# Patient Record
Sex: FEMALE | Race: WHITE | Employment: OTHER | URBAN - METROPOLITAN AREA
[De-identification: names, ages, dates, MRNs, and addresses within clinical notes are randomized per-mention and may not be internally consistent; named-entity substitution may affect disease eponyms.]

---

## 2017-01-13 ENCOUNTER — PREPPED CHART (OUTPATIENT)
Dept: URBAN - METROPOLITAN AREA CLINIC 43 | Facility: CLINIC | Age: 57
End: 2017-01-13

## 2017-07-12 ASSESSMENT — VISUAL ACUITY
OD_SC: J1
OS_CC: J3
OD_CC: J2
OS_CC: 20/20
OS_SC: J1
OD_CC: 20/20

## 2017-07-12 ASSESSMENT — TONOMETRY
OS_IOP_MMHG: 18
OD_IOP_MMHG: 17

## 2018-10-26 ENCOUNTER — ESTABLISHED COMPREHENSIVE EXAM (OUTPATIENT)
Dept: URBAN - METROPOLITAN AREA CLINIC 43 | Facility: CLINIC | Age: 58
End: 2018-10-26

## 2018-10-26 DIAGNOSIS — H52.203: ICD-10-CM

## 2018-10-26 DIAGNOSIS — H52.4: ICD-10-CM

## 2018-10-26 PROCEDURE — 92014 COMPRE OPH EXAM EST PT 1/>: CPT

## 2018-10-26 PROCEDURE — 92015 DETERMINE REFRACTIVE STATE: CPT

## 2018-10-26 ASSESSMENT — VISUAL ACUITY
OD_CC: 20/20-1
OS_CC: 20/30+2
OS_SC: J1
OS_CC: J3
OD_SC: J1
OD_CC: J3

## 2018-10-26 ASSESSMENT — TONOMETRY
OS_IOP_MMHG: 16
OD_IOP_MMHG: 16

## 2020-01-20 ENCOUNTER — ESTABLISHED COMPREHENSIVE EXAM (OUTPATIENT)
Dept: URBAN - METROPOLITAN AREA CLINIC 39 | Facility: CLINIC | Age: 60
End: 2020-01-20

## 2020-01-20 DIAGNOSIS — H52.4: ICD-10-CM

## 2020-01-20 DIAGNOSIS — H52.13: ICD-10-CM

## 2020-01-20 DIAGNOSIS — Z46.0: ICD-10-CM

## 2020-01-20 DIAGNOSIS — H52.203: ICD-10-CM

## 2020-01-20 PROCEDURE — 92310-2 LEVEL 2 CONTACT LENS MANAGEMENT

## 2020-01-20 PROCEDURE — 92015 DETERMINE REFRACTIVE STATE: CPT

## 2020-01-20 PROCEDURE — 92014 COMPRE OPH EXAM EST PT 1/>: CPT

## 2020-01-20 ASSESSMENT — VISUAL ACUITY
OS_CC: 20/20-1
OD_CC: J1
OD_CC: 20/20
OS_SC: CF 5FT
OS_SC: J1
OD_SC: CF 5FT
OS_CC: J1
OD_SC: J1

## 2020-01-20 ASSESSMENT — TONOMETRY
OS_IOP_MMHG: 17
OD_IOP_MMHG: 16

## 2020-01-30 ENCOUNTER — CONTACT LENS FOLLOW UP (OUTPATIENT)
Dept: URBAN - METROPOLITAN AREA CLINIC 39 | Facility: CLINIC | Age: 60
End: 2020-01-30

## 2020-01-30 DIAGNOSIS — Z46.0: ICD-10-CM

## 2020-01-30 PROCEDURE — 92310F

## 2020-01-30 ASSESSMENT — VISUAL ACUITY
OD_CC: 20/80
OS_CC: 20/20-2
OS_CC: J12
OD_CC: J1

## 2022-05-26 ENCOUNTER — COMPREHENSIVE EXAM (OUTPATIENT)
Dept: URBAN - METROPOLITAN AREA CLINIC 35 | Facility: CLINIC | Age: 62
End: 2022-05-26

## 2022-05-26 DIAGNOSIS — H52.203: ICD-10-CM

## 2022-05-26 DIAGNOSIS — H52.13: ICD-10-CM

## 2022-05-26 DIAGNOSIS — H52.4: ICD-10-CM

## 2022-05-26 PROCEDURE — 92015 DETERMINE REFRACTIVE STATE: CPT

## 2022-05-26 PROCEDURE — 92014 COMPRE OPH EXAM EST PT 1/>: CPT

## 2022-05-26 ASSESSMENT — TONOMETRY
OD_IOP_MMHG: 15
OS_IOP_MMHG: 15

## 2022-05-26 ASSESSMENT — KERATOMETRY
OD_K1POWER_DIOPTERS: 46.25
OD_K2POWER_DIOPTERS: 46.75
OD_AXISANGLE2_DEGREES: 165
OS_K2POWER_DIOPTERS: 46.25
OS_AXISANGLE2_DEGREES: 070
OD_AXISANGLE_DEGREES: 75
OS_AXISANGLE_DEGREES: 160
OS_K1POWER_DIOPTERS: 46.50

## 2022-05-26 ASSESSMENT — VISUAL ACUITY
OD_CC: J1
OS_CC: J1-
OS_CC: 20/25+1
OD_CC: 20/20-1

## 2023-12-12 ENCOUNTER — COMPREHENSIVE EXAM (OUTPATIENT)
Dept: URBAN - METROPOLITAN AREA CLINIC 35 | Facility: CLINIC | Age: 63
End: 2023-12-12

## 2023-12-12 DIAGNOSIS — H52.203: ICD-10-CM

## 2023-12-12 DIAGNOSIS — H52.4: ICD-10-CM

## 2023-12-12 DIAGNOSIS — H52.13: ICD-10-CM

## 2023-12-12 DIAGNOSIS — Z46.0: ICD-10-CM

## 2023-12-12 PROCEDURE — 92015 DETERMINE REFRACTIVE STATE: CPT

## 2023-12-12 PROCEDURE — 92310-3 LEVEL 3 CONTACT LENS MANAGEMENT

## 2023-12-12 PROCEDURE — 92014 COMPRE OPH EXAM EST PT 1/>: CPT

## 2023-12-12 ASSESSMENT — VISUAL ACUITY
OU_SC: J1
OD_CC: 20/30
OS_CC: 20/30
OD_SC: J1
OU_CC: 20/20-1
OS_SC: J3

## 2023-12-12 ASSESSMENT — KERATOMETRY
OS_K2POWER_DIOPTERS: 46.75
OD_AXISANGLE_DEGREES: 80
OD_AXISANGLE2_DEGREES: 170
OS_K1POWER_DIOPTERS: 46.25
OS_AXISANGLE2_DEGREES: 25
OS_AXISANGLE_DEGREES: 115
OD_K1POWER_DIOPTERS: 46.25
OD_K2POWER_DIOPTERS: 46.75

## 2023-12-12 ASSESSMENT — TONOMETRY
OD_IOP_MMHG: 19
OS_IOP_MMHG: 17

## 2024-02-20 ENCOUNTER — EMERGENCY VISIT (OUTPATIENT)
Dept: URBAN - METROPOLITAN AREA CLINIC 43 | Facility: CLINIC | Age: 64
End: 2024-02-20

## 2024-02-20 DIAGNOSIS — H52.4: ICD-10-CM

## 2024-02-20 DIAGNOSIS — H35.371: ICD-10-CM

## 2024-02-20 DIAGNOSIS — Z46.0: ICD-10-CM

## 2024-02-20 DIAGNOSIS — H43.812: ICD-10-CM

## 2024-02-20 DIAGNOSIS — H52.13: ICD-10-CM

## 2024-02-20 DIAGNOSIS — H52.203: ICD-10-CM

## 2024-02-20 PROCEDURE — 92012 INTRM OPH EXAM EST PATIENT: CPT

## 2024-02-20 PROCEDURE — 92250 FUNDUS PHOTOGRAPHY W/I&R: CPT

## 2024-02-20 ASSESSMENT — KERATOMETRY
OS_K1POWER_DIOPTERS: 46.25
OD_AXISANGLE2_DEGREES: 170
OS_K2POWER_DIOPTERS: 46.75
OS_AXISANGLE2_DEGREES: 25
OD_K1POWER_DIOPTERS: 46.25
OD_AXISANGLE_DEGREES: 80
OS_AXISANGLE_DEGREES: 115
OD_K2POWER_DIOPTERS: 46.75

## 2024-02-20 ASSESSMENT — TONOMETRY
OD_IOP_MMHG: 16
OS_IOP_MMHG: 15

## 2024-02-20 ASSESSMENT — VISUAL ACUITY
OD_CC: 20/20-2
OS_CC: 20/20-2

## 2024-03-21 ENCOUNTER — FOLLOW UP (OUTPATIENT)
Dept: URBAN - METROPOLITAN AREA CLINIC 43 | Facility: CLINIC | Age: 64
End: 2024-03-21

## 2024-03-21 DIAGNOSIS — H52.4: ICD-10-CM

## 2024-03-21 DIAGNOSIS — H52.13: ICD-10-CM

## 2024-03-21 DIAGNOSIS — H43.812: ICD-10-CM

## 2024-03-21 DIAGNOSIS — H52.203: ICD-10-CM

## 2024-03-21 DIAGNOSIS — Z46.0: ICD-10-CM

## 2024-03-21 PROCEDURE — 99213 OFFICE O/P EST LOW 20 MIN: CPT

## 2024-03-21 ASSESSMENT — KERATOMETRY
OS_K1POWER_DIOPTERS: 46.25
OD_K1POWER_DIOPTERS: 46.25
OS_AXISANGLE_DEGREES: 115
OS_K2POWER_DIOPTERS: 46.75
OS_AXISANGLE2_DEGREES: 25
OD_AXISANGLE_DEGREES: 80
OD_K2POWER_DIOPTERS: 46.75
OD_AXISANGLE2_DEGREES: 170

## 2024-03-21 ASSESSMENT — VISUAL ACUITY
OD_CC: 20/20-1
OS_CC: 20/20

## 2024-03-21 ASSESSMENT — TONOMETRY
OD_IOP_MMHG: 14
OS_IOP_MMHG: 14

## 2024-12-16 ENCOUNTER — COMPREHENSIVE EXAM (OUTPATIENT)
Age: 64
End: 2024-12-16

## 2024-12-16 DIAGNOSIS — H52.7: ICD-10-CM

## 2024-12-16 DIAGNOSIS — Z46.0: ICD-10-CM

## 2024-12-16 PROCEDURE — 92015 DETERMINE REFRACTIVE STATE: CPT

## 2024-12-16 PROCEDURE — 92014 COMPRE OPH EXAM EST PT 1/>: CPT

## 2024-12-16 PROCEDURE — 92310-3 LEVEL 3 SOFT LENS UPDATE

## 2025-01-04 ENCOUNTER — EMERGENCY VISIT (OUTPATIENT)
Age: 65
End: 2025-01-04

## 2025-01-04 DIAGNOSIS — H25.813: ICD-10-CM

## 2025-01-04 DIAGNOSIS — H35.61: ICD-10-CM

## 2025-01-04 DIAGNOSIS — H35.371: ICD-10-CM

## 2025-01-04 DIAGNOSIS — H43.813: ICD-10-CM

## 2025-01-04 DIAGNOSIS — H04.123: ICD-10-CM

## 2025-01-04 PROCEDURE — 99214 OFFICE O/P EST MOD 30 MIN: CPT
